# Patient Record
Sex: MALE | Race: ASIAN | NOT HISPANIC OR LATINO | URBAN - METROPOLITAN AREA
[De-identification: names, ages, dates, MRNs, and addresses within clinical notes are randomized per-mention and may not be internally consistent; named-entity substitution may affect disease eponyms.]

---

## 2018-07-17 ENCOUNTER — EMERGENCY (EMERGENCY)
Facility: HOSPITAL | Age: 28
LOS: 1 days | Discharge: HOME | End: 2018-07-17
Admitting: EMERGENCY MEDICINE
Payer: COMMERCIAL

## 2018-07-17 VITALS
DIASTOLIC BLOOD PRESSURE: 79 MMHG | SYSTOLIC BLOOD PRESSURE: 113 MMHG | TEMPERATURE: 98 F | OXYGEN SATURATION: 100 % | RESPIRATION RATE: 16 BRPM | HEART RATE: 67 BPM

## 2018-07-17 PROCEDURE — 99282 EMERGENCY DEPT VISIT SF MDM: CPT | Mod: 25

## 2018-07-17 PROCEDURE — 99053 MED SERV 10PM-8AM 24 HR FAC: CPT

## 2018-07-17 RX ORDER — IBUPROFEN 200 MG
600 TABLET ORAL ONCE
Qty: 0 | Refills: 0 | Status: COMPLETED | OUTPATIENT
Start: 2018-07-17 | End: 2018-07-17

## 2018-07-17 RX ADMIN — Medication 600 MILLIGRAM(S): at 02:01

## 2018-07-17 NOTE — ED PROVIDER NOTE - PLAN OF CARE
pls rest, drink plenty f fluids, tylenol/motrin every 6-8 hours as needed, f/u with your pmd, return for any worsening symptoms or any other concerning symptoms
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
## 2021-10-13 ENCOUNTER — EMERGENCY (EMERGENCY)
Facility: HOSPITAL | Age: 31
LOS: 1 days | Discharge: HOME | End: 2021-10-13
Admitting: HOSPITALIST
Payer: MEDICAID

## 2021-10-13 VITALS
OXYGEN SATURATION: 100 % | DIASTOLIC BLOOD PRESSURE: 88 MMHG | SYSTOLIC BLOOD PRESSURE: 126 MMHG | RESPIRATION RATE: 18 BRPM | HEART RATE: 84 BPM | TEMPERATURE: 98 F

## 2021-10-13 PROCEDURE — 93010 ELECTROCARDIOGRAM REPORT: CPT

## 2021-10-13 PROCEDURE — 99284 EMERGENCY DEPT VISIT MOD MDM: CPT | Mod: 25

## 2021-10-13 RX ORDER — ACETAMINOPHEN 500 MG
650 TABLET ORAL ONCE
Refills: 0 | Status: COMPLETED | OUTPATIENT
Start: 2021-10-13 | End: 2021-10-13

## 2021-10-13 RX ORDER — SODIUM CHLORIDE 9 MG/ML
1000 INJECTION INTRAMUSCULAR; INTRAVENOUS; SUBCUTANEOUS ONCE
Refills: 0 | Status: COMPLETED | OUTPATIENT
Start: 2021-10-13 | End: 2021-10-13

## 2021-10-14 VITALS
RESPIRATION RATE: 17 BRPM | OXYGEN SATURATION: 100 % | SYSTOLIC BLOOD PRESSURE: 142 MMHG | DIASTOLIC BLOOD PRESSURE: 76 MMHG | TEMPERATURE: 98 F | HEART RATE: 93 BPM

## 2021-10-14 LAB
ALBUMIN SERPL ELPH-MCNC: 4.6 G/DL — SIGNIFICANT CHANGE UP (ref 3.3–5)
ALP SERPL-CCNC: 100 U/L — SIGNIFICANT CHANGE UP (ref 40–120)
ALT FLD-CCNC: 25 U/L — SIGNIFICANT CHANGE UP (ref 4–41)
ANION GAP SERPL CALC-SCNC: 11 MMOL/L — SIGNIFICANT CHANGE UP (ref 7–14)
AST SERPL-CCNC: 25 U/L — SIGNIFICANT CHANGE UP (ref 4–40)
BASOPHILS # BLD AUTO: 0.04 K/UL — SIGNIFICANT CHANGE UP (ref 0–0.2)
BASOPHILS NFR BLD AUTO: 0.6 % — SIGNIFICANT CHANGE UP (ref 0–2)
BILIRUB SERPL-MCNC: <0.2 MG/DL — SIGNIFICANT CHANGE UP (ref 0.2–1.2)
BUN SERPL-MCNC: 9 MG/DL — SIGNIFICANT CHANGE UP (ref 7–23)
CALCIUM SERPL-MCNC: 9.8 MG/DL — SIGNIFICANT CHANGE UP (ref 8.4–10.5)
CHLORIDE SERPL-SCNC: 101 MMOL/L — SIGNIFICANT CHANGE UP (ref 98–107)
CO2 SERPL-SCNC: 26 MMOL/L — SIGNIFICANT CHANGE UP (ref 22–31)
CREAT SERPL-MCNC: 0.84 MG/DL — SIGNIFICANT CHANGE UP (ref 0.5–1.3)
EOSINOPHIL # BLD AUTO: 0.05 K/UL — SIGNIFICANT CHANGE UP (ref 0–0.5)
EOSINOPHIL NFR BLD AUTO: 0.7 % — SIGNIFICANT CHANGE UP (ref 0–6)
GLUCOSE SERPL-MCNC: 94 MG/DL — SIGNIFICANT CHANGE UP (ref 70–99)
HCT VFR BLD CALC: 45.8 % — SIGNIFICANT CHANGE UP (ref 39–50)
HGB BLD-MCNC: 15.4 G/DL — SIGNIFICANT CHANGE UP (ref 13–17)
IANC: 2.93 K/UL — SIGNIFICANT CHANGE UP (ref 1.5–8.5)
IMM GRANULOCYTES NFR BLD AUTO: 0.4 % — SIGNIFICANT CHANGE UP (ref 0–1.5)
LYMPHOCYTES # BLD AUTO: 3.35 K/UL — HIGH (ref 1–3.3)
LYMPHOCYTES # BLD AUTO: 48.1 % — HIGH (ref 13–44)
MAGNESIUM SERPL-MCNC: 2.3 MG/DL — SIGNIFICANT CHANGE UP (ref 1.6–2.6)
MCHC RBC-ENTMCNC: 31 PG — SIGNIFICANT CHANGE UP (ref 27–34)
MCHC RBC-ENTMCNC: 33.6 GM/DL — SIGNIFICANT CHANGE UP (ref 32–36)
MCV RBC AUTO: 92.3 FL — SIGNIFICANT CHANGE UP (ref 80–100)
MONOCYTES # BLD AUTO: 0.57 K/UL — SIGNIFICANT CHANGE UP (ref 0–0.9)
MONOCYTES NFR BLD AUTO: 8.2 % — SIGNIFICANT CHANGE UP (ref 2–14)
NEUTROPHILS # BLD AUTO: 2.93 K/UL — SIGNIFICANT CHANGE UP (ref 1.8–7.4)
NEUTROPHILS NFR BLD AUTO: 42 % — LOW (ref 43–77)
NRBC # BLD: 0 /100 WBCS — SIGNIFICANT CHANGE UP
NRBC # FLD: 0 K/UL — SIGNIFICANT CHANGE UP
PHOSPHATE SERPL-MCNC: 3.9 MG/DL — SIGNIFICANT CHANGE UP (ref 2.5–4.5)
PLATELET # BLD AUTO: 172 K/UL — SIGNIFICANT CHANGE UP (ref 150–400)
POTASSIUM SERPL-MCNC: 3.8 MMOL/L — SIGNIFICANT CHANGE UP (ref 3.5–5.3)
POTASSIUM SERPL-SCNC: 3.8 MMOL/L — SIGNIFICANT CHANGE UP (ref 3.5–5.3)
PROT SERPL-MCNC: 7.8 G/DL — SIGNIFICANT CHANGE UP (ref 6–8.3)
RBC # BLD: 4.96 M/UL — SIGNIFICANT CHANGE UP (ref 4.2–5.8)
RBC # FLD: 12.2 % — SIGNIFICANT CHANGE UP (ref 10.3–14.5)
SODIUM SERPL-SCNC: 138 MMOL/L — SIGNIFICANT CHANGE UP (ref 135–145)
TSH SERPL-MCNC: 3.37 UIU/ML — SIGNIFICANT CHANGE UP (ref 0.27–4.2)
WBC # BLD: 6.97 K/UL — SIGNIFICANT CHANGE UP (ref 3.8–10.5)
WBC # FLD AUTO: 6.97 K/UL — SIGNIFICANT CHANGE UP (ref 3.8–10.5)

## 2021-10-14 PROCEDURE — 71046 X-RAY EXAM CHEST 2 VIEWS: CPT | Mod: 26

## 2021-10-14 RX ADMIN — Medication 650 MILLIGRAM(S): at 00:31

## 2021-10-14 RX ADMIN — SODIUM CHLORIDE 1000 MILLILITER(S): 9 INJECTION INTRAMUSCULAR; INTRAVENOUS; SUBCUTANEOUS at 00:32

## 2021-10-14 NOTE — ED PROVIDER NOTE - CLINICAL SUMMARY MEDICAL DECISION MAKING FREE TEXT BOX
HPI- Patient is a 31 y.o male with no known PMHx who presents to ED c/o generalized weakness and fatigue over past few weeks. Pt states he was living in [place] x 2 months and symptoms began there and then he returned home on 9/27 and states still feeling tired. Also states over past 5 days he developed sinus congestion and pressure and a post nasal drip. Pt went to his PCP 2 days ago and was given nasal spray with some relief. Pt states he was tested for covid also 5 days ago and it was negative. Admits children are sick at home with URI. Pt mentioned to day he has some "chest congestion" but denies chest pain. Denies sob, n/v/d, melena, fevers, chills, headaches, dizziness, rashes, pleuritic pain, or any other complaints.

## 2021-10-14 NOTE — ED PROVIDER NOTE - PATIENT PORTAL LINK FT
You can access the FollowMyHealth Patient Portal offered by [facility] by registering at the following website: [email or web address]. By joining [facility]’s FollowMyHealth portal, you will also be able to view your health information using other applications (apps) compatible with our system.

## 2021-10-14 NOTE — ED PROVIDER NOTE - PROGRESS NOTE DETAILS
JOSE Whitaker- Pt labs and imaging reviewed and discussed with patient. No acute findings noted. All questions and concerns addressed. Strict return instructions given. Pt advised to f/u with PCP.

## 2021-10-14 NOTE — ED ADULT NURSE NOTE - OBJECTIVE STATEMENT
Pt A&ox4, ambulatory. Pt in NAD, resp equal and unlabored. Pt endorses weakness and lethargy for 1 week. Pt denies; decrease in appetite, syncope, SOB, chest pain, n/v/d, fever/chills. 20G to L ac labs drawn and sent as per Md order, pt medicated as per MD order.

## 2021-10-14 NOTE — ED PROVIDER NOTE - NSFOLLOWUPINSTRUCTIONS_ED_ALL_ED_FT
Patient advised to follow up with PRIMARY CARE DOCTOR IN 1-2 DAYS  and told to return to the emergency department immediately for any new or concerning symptoms  OR ANY OTHER COMPLAINTS. Patient agrees with plan.      Rest. stay hydrated  Continue to use nasal spray     Advance activity as tolerated.  Continue all previously prescribed medications as directed unless otherwise instructed.  Follow up with your primary care physician in 48-72 hours- bring copies of your results.  Return to the ER for worsening or persistent symptoms, and/or ANY NEW OR CONCERNING SYMPTOMS. If you have issues obtaining follow up, please call: [phone] to obtain a doctor or specialist who takes your insurance in your area.  You may call [phone] to make an appointment with the internal medicine clinic.

## 2021-10-14 NOTE — ED PROVIDER NOTE - PHYSICAL EXAMINATION
Vital signs reviewed.   CONSTITUTIONAL: Well-appearing; well-nourished; in no apparent distress. Non-toxic appearing.   HEAD: Normocephalic, atraumatic.  EYES: PERRL, EOM intact, conjunctiva and sclera WNL.  ENT: normal nose; + nasal congestion; normal pharynx with no tonsillar hypertrophy, no erythema, no exudate, no lymphadenopathy.  NECK/LYMPH: Supple; non-tender;   CARD: Normal S1, S2; no murmurs, rubs, or gallops noted.  RESP: Normal chest excursion with respiration; breath sounds clear and equal bilaterally; no wheezes, rhonchi, or rales.  ABD/GI: soft, non-distended; non-tender;  EXT/MS: moves all extremities  SKIN: Normal for age and race; warm; dry; good turgor; no apparent lesions or exudate noted.  NEURO: Awake, alert, oriented x 3, no gross deficits, no motor or sensory deficit noted.  PSYCH: Normal mood; appropriate affect.

## 2021-10-31 ENCOUNTER — EMERGENCY (EMERGENCY)
Facility: HOSPITAL | Age: 31
LOS: 1 days | Discharge: HOME | End: 2021-10-31
Attending: EMERGENCY MEDICINE | Admitting: EMERGENCY MEDICINE
Payer: MEDICAID

## 2021-10-31 VITALS
RESPIRATION RATE: 16 BRPM | OXYGEN SATURATION: 100 % | DIASTOLIC BLOOD PRESSURE: 75 MMHG | SYSTOLIC BLOOD PRESSURE: 107 MMHG | TEMPERATURE: 99 F | HEART RATE: 66 BPM

## 2021-10-31 VITALS
TEMPERATURE: 98 F | SYSTOLIC BLOOD PRESSURE: 129 MMHG | RESPIRATION RATE: 17 BRPM | OXYGEN SATURATION: 100 % | DIASTOLIC BLOOD PRESSURE: 82 MMHG | HEART RATE: 106 BPM

## 2021-10-31 LAB
ALBUMIN SERPL ELPH-MCNC: 5 G/DL — SIGNIFICANT CHANGE UP (ref 3.3–5)
ALP SERPL-CCNC: 79 U/L — SIGNIFICANT CHANGE UP (ref 40–120)
ALT FLD-CCNC: 23 U/L — SIGNIFICANT CHANGE UP (ref 4–41)
ANION GAP SERPL CALC-SCNC: 13 MMOL/L — SIGNIFICANT CHANGE UP (ref 7–14)
AST SERPL-CCNC: 21 U/L — SIGNIFICANT CHANGE UP (ref 4–40)
BASOPHILS # BLD AUTO: 0.03 K/UL — SIGNIFICANT CHANGE UP (ref 0–0.2)
BASOPHILS NFR BLD AUTO: 0.4 % — SIGNIFICANT CHANGE UP (ref 0–2)
BILIRUB SERPL-MCNC: 0.4 MG/DL — SIGNIFICANT CHANGE UP (ref 0.2–1.2)
BUN SERPL-MCNC: 10 MG/DL — SIGNIFICANT CHANGE UP (ref 7–23)
CALCIUM SERPL-MCNC: 9.9 MG/DL — SIGNIFICANT CHANGE UP (ref 8.4–10.5)
CHLORIDE SERPL-SCNC: 102 MMOL/L — SIGNIFICANT CHANGE UP (ref 98–107)
CO2 SERPL-SCNC: 25 MMOL/L — SIGNIFICANT CHANGE UP (ref 22–31)
CREAT SERPL-MCNC: 0.88 MG/DL — SIGNIFICANT CHANGE UP (ref 0.5–1.3)
EOSINOPHIL # BLD AUTO: 0.02 K/UL — SIGNIFICANT CHANGE UP (ref 0–0.5)
EOSINOPHIL NFR BLD AUTO: 0.2 % — SIGNIFICANT CHANGE UP (ref 0–6)
GLUCOSE SERPL-MCNC: 108 MG/DL — HIGH (ref 70–99)
HCT VFR BLD CALC: 45.8 % — SIGNIFICANT CHANGE UP (ref 39–50)
HGB BLD-MCNC: 16 G/DL — SIGNIFICANT CHANGE UP (ref 13–17)
IANC: 5.9 K/UL — SIGNIFICANT CHANGE UP (ref 1.5–8.5)
IMM GRANULOCYTES NFR BLD AUTO: 0.2 % — SIGNIFICANT CHANGE UP (ref 0–1.5)
LYMPHOCYTES # BLD AUTO: 1.81 K/UL — SIGNIFICANT CHANGE UP (ref 1–3.3)
LYMPHOCYTES # BLD AUTO: 21.5 % — SIGNIFICANT CHANGE UP (ref 13–44)
MAGNESIUM SERPL-MCNC: 2.3 MG/DL — SIGNIFICANT CHANGE UP (ref 1.6–2.6)
MCHC RBC-ENTMCNC: 31.6 PG — SIGNIFICANT CHANGE UP (ref 27–34)
MCHC RBC-ENTMCNC: 34.9 GM/DL — SIGNIFICANT CHANGE UP (ref 32–36)
MCV RBC AUTO: 90.5 FL — SIGNIFICANT CHANGE UP (ref 80–100)
MONOCYTES # BLD AUTO: 0.65 K/UL — SIGNIFICANT CHANGE UP (ref 0–0.9)
MONOCYTES NFR BLD AUTO: 7.7 % — SIGNIFICANT CHANGE UP (ref 2–14)
NEUTROPHILS # BLD AUTO: 5.9 K/UL — SIGNIFICANT CHANGE UP (ref 1.8–7.4)
NEUTROPHILS NFR BLD AUTO: 70 % — SIGNIFICANT CHANGE UP (ref 43–77)
NRBC # BLD: 0 /100 WBCS — SIGNIFICANT CHANGE UP
NRBC # FLD: 0 K/UL — SIGNIFICANT CHANGE UP
PHOSPHATE SERPL-MCNC: 3.2 MG/DL — SIGNIFICANT CHANGE UP (ref 2.5–4.5)
PLATELET # BLD AUTO: 155 K/UL — SIGNIFICANT CHANGE UP (ref 150–400)
POTASSIUM SERPL-MCNC: 4.1 MMOL/L — SIGNIFICANT CHANGE UP (ref 3.5–5.3)
POTASSIUM SERPL-SCNC: 4.1 MMOL/L — SIGNIFICANT CHANGE UP (ref 3.5–5.3)
PROT SERPL-MCNC: 7.8 G/DL — SIGNIFICANT CHANGE UP (ref 6–8.3)
RBC # BLD: 5.06 M/UL — SIGNIFICANT CHANGE UP (ref 4.2–5.8)
RBC # FLD: 12.2 % — SIGNIFICANT CHANGE UP (ref 10.3–14.5)
SODIUM SERPL-SCNC: 140 MMOL/L — SIGNIFICANT CHANGE UP (ref 135–145)
WBC # BLD: 8.43 K/UL — SIGNIFICANT CHANGE UP (ref 3.8–10.5)
WBC # FLD AUTO: 8.43 K/UL — SIGNIFICANT CHANGE UP (ref 3.8–10.5)

## 2021-10-31 PROCEDURE — 99284 EMERGENCY DEPT VISIT MOD MDM: CPT | Mod: 25

## 2021-10-31 PROCEDURE — 93010 ELECTROCARDIOGRAM REPORT: CPT

## 2021-10-31 NOTE — ED PROVIDER NOTE - PATIENT PORTAL LINK FT
You can access the FollowMyHealth Patient Portal offered by [facility] by registering at the following website: [email or web address]. By joining [facility]’s FollowMyHealth portal, you will also be able to view your health information using other applications (apps) compatible with our system.

## 2021-10-31 NOTE — ED ADULT NURSE NOTE - OBJECTIVE STATEMENT
Pt received in INT10C. C/o palpitations, dizziness, and diaphoresis lasting 1-2 hours after eating meals. Denies PMH. A&Ox4, ambulatory. Breathing even and unlabored. Denies chest pain, abd pain, nausea, vomiting, headache, dizziness or SOB at this time. State saw PMD and was advised to eat small frequent meals. 20G IV placed on left AC. Labs drawn and sent. Will reassess.

## 2021-10-31 NOTE — ED PROVIDER NOTE - OBJECTIVE STATEMENT
31yM w/no stated pmhx presenting with palpitations, shakiness, lightheadedness after eating x 1 week. Pt states for the past week after eating but worse in the morning time he feels shaky, hands have tremor, his heart "pounds" with palpitations and he is irritable. Pt states he saw his PMD and was instructed to eat smaller meals and exercise. Pt states he feels these symptoms when his blood sugar is high or low, although does not have glucometer at home, Pt denies fever/chills, abd pain, nausea, vomiting, diarrhea, chest pain, shortness of breath, cough, rhinorrhea, alcohol use, drug use or any other concerns.

## 2021-10-31 NOTE — ED PROVIDER NOTE - MDM ORDERS SUBMITTED SELECTION
Body Location Override (Optional - Billing Will Still Be Based On Selected Body Map Location If Applicable): left medial shin Wound Evaluated By: Dr. Tiarra Huitron Detail Level: Detailed Wound Dressing Override (Optional): vaseline and bandaid Labs/EKG/Imaging Studies

## 2021-10-31 NOTE — ED PROVIDER NOTE - CLINICAL SUMMARY MEDICAL DECISION MAKING FREE TEXT BOX
31yM w/no stated pmhx presenting with palpitations, shakiness, lightheadedness after eating x 1 week. On exam pt is well appearing, EKG NSR, abd soft. Pt was recently seen in ED on 10/14, had normal cbc/cmp/tsh, CXR. Plan: will repeat labs to r/o acute electrolyte abnormality, cbc/cmp/mag/phos, EKG. Likely outpt PMD/GI follow up Balijnder att: 32 yo M w/no stated pmhx presenting with palpitations, shakiness, lightheadedness after eating x 1 week. On exam pt is well appearing, EKG NSR, abd soft. Pt was recently seen in ED on 10/14, had normal cbc/cmp/tsh, CXR. Plan: will repeat labs to r/o acute electrolyte abnormality, cbc/cmp/mag/phos, EKG. Likely outpt PMD/GI follow up Baljinder att: 32 yo M w/no stated pmhx presenting with palpitations, shakiness, lightheadedness after eating x 1 week. Not having these symptoms while in the ED. On exam pt is well appearing, EKG NSR, abd soft. Pt was recently seen in ED on 10/14, had normal cbc/cmp/tsh, CXR. Plan: will repeat labs to r/o acute electrolyte abnormality, cbc/cmp/mag/phos, EKG. Likely outpt PMD/GI follow up.

## 2021-10-31 NOTE — ED ADULT TRIAGE NOTE - CHIEF COMPLAINT QUOTE
Pt c/o irritation, palpitations, shakiness and dizziness x1 week after eating. Pt was seen by PCP and told to "eat smaller meals" Denies PMH

## 2021-10-31 NOTE — ED PROVIDER NOTE - NSFOLLOWUPINSTRUCTIONS_ED_ALL_ED_FT
Follow up with your primary care doctor within 1 week  Follow up with a GI doctor within 1 week, referral list provided  Return to the ER with any worsening or concerning symptoms, weakness, fever, abdominal pain, feeling faint, chest pain or any other concerns.

## 2021-10-31 NOTE — ED PROVIDER NOTE - PROGRESS NOTE DETAILS
JOSE Rios: Labs within normal, vitals stable. Will d/c home with PMD/GI follow up, endo referral list given as pt feels symptoms are related to his blood sugar. Pt will return with any worsening or concerning symptoms.

## 2021-11-05 ENCOUNTER — APPOINTMENT (OUTPATIENT)
Dept: GASTROENTEROLOGY | Facility: CLINIC | Age: 31
End: 2021-11-05
Payer: MEDICAID

## 2021-11-05 ENCOUNTER — TRANSCRIPTION ENCOUNTER (OUTPATIENT)
Age: 31
End: 2021-11-05

## 2021-11-05 VITALS
BODY MASS INDEX: 21.33 KG/M2 | WEIGHT: 128 LBS | OXYGEN SATURATION: 98 % | DIASTOLIC BLOOD PRESSURE: 75 MMHG | TEMPERATURE: 95.9 F | HEART RATE: 84 BPM | HEIGHT: 65 IN | SYSTOLIC BLOOD PRESSURE: 114 MMHG

## 2021-11-05 DIAGNOSIS — R11.0 NAUSEA: ICD-10-CM

## 2021-11-05 DIAGNOSIS — K31.89 OTHER DISEASES OF STOMACH AND DUODENUM: ICD-10-CM

## 2021-11-05 DIAGNOSIS — Z78.9 OTHER SPECIFIED HEALTH STATUS: ICD-10-CM

## 2021-11-05 DIAGNOSIS — F17.200 NICOTINE DEPENDENCE, UNSPECIFIED, UNCOMPLICATED: ICD-10-CM

## 2021-11-05 DIAGNOSIS — K21.9 GASTRO-ESOPHAGEAL REFLUX DISEASE W/OUT ESOPHAGITIS: ICD-10-CM

## 2021-11-05 PROBLEM — Z00.00 ENCOUNTER FOR PREVENTIVE HEALTH EXAMINATION: Status: ACTIVE | Noted: 2021-11-05

## 2021-11-05 PROCEDURE — 99204 OFFICE O/P NEW MOD 45 MIN: CPT

## 2021-11-05 RX ORDER — PANTOPRAZOLE 40 MG/1
40 TABLET, DELAYED RELEASE ORAL DAILY
Qty: 30 | Refills: 3 | Status: ACTIVE | COMMUNITY
Start: 2021-11-05 | End: 1900-01-01

## 2021-11-05 RX ORDER — SIMETHICONE 180 MG
180 CAPSULE ORAL 4 TIMES DAILY
Qty: 120 | Refills: 3 | Status: ACTIVE | COMMUNITY
Start: 2021-11-05 | End: 1900-01-01

## 2021-11-05 NOTE — ASSESSMENT
[FreeTextEntry1] : Dyspepsia: The patient complains of dyspeptic symptoms.  The patient was advised to abide by an anti-gas diet.  The patient was given a pamphlet for anti-gas.  The patient and I reviewed the anti-gas diet at length. The patient is to start on a trial of Phazyme one tablet 3 times a day p.r.n. abdominal pain and gas.\par GERD: The patient was advised to avoid late-night meals and dietary indiscretions.  The patient was advised to avoid fried and fatty foods.  The patient was advised to abide by an anti-GERD diet. The patient was given a pamphlet for anti-GERD.  The patient and I reviewed the anti-GERD diet at length. I recommend a trial of Pantoprazole 40 mg once a day x 3 months for the symptoms.\par Nausea: The patient complains of nausea. If the symptoms of nausea persists, the patient may require a trial of Zofran 4 mg twice a day. \par If the symptoms persist, the patient may require an upper endoscopy to assess for peptic ulcer disease versus esophagitis.  The patient was told of the risks and benefits of the procedure.  The patient was told of the risks of perforation, emergency surgery, bleeding, infections and missed lesions.  The patient agreed and will follow-up to reassess the symptoms.\par I recommend an H. pylori breath test to assess the symptoms.\par Prior ER Evaluation:The patient was previously evaluated at the [facility] emergency room for abdominal pain, diarrhea, nausea and vomiting.   The patient had blood work and imaging studies to assess the symptoms.  I reviewed the blood work and imaging studies performed in the emergency room.\par Follow-up: The patient is to follow-up in the office in 4 weeks to reassess the symptoms. The patient was told to call the office if any further problems.

## 2021-11-05 NOTE — HISTORY OF PRESENT ILLNESS
[None] : had no significant interval events [Vomiting] : denies vomiting [Diarrhea] : denies diarrhea [Constipation] : denies constipation [Yellow Skin Or Eyes (Jaundice)] : denies jaundice [Rectal Pain] : denies rectal pain [Heartburn] : heartburn [Nausea] : nausea [Abdominal Pain] : abdominal pain [Abdominal Swelling] : abdominal swelling [GERD] : gastroesophageal reflux disease [Wt Gain ___ Lbs] : no recent weight gain [Wt Loss ___ Lbs] : no recent weight loss [Hiatus Hernia] : no hiatus hernia [Peptic Ulcer Disease] : no peptic ulcer disease [Pancreatitis] : no pancreatitis [Cholelithiasis] : no cholelithiasis [Kidney Stone] : no kidney stone [Inflammatory Bowel Disease] : no inflammatory bowel disease [Irritable Bowel Syndrome] : no irritable bowel syndrome [Diverticulitis] : no diverticulitis [Alcohol Abuse] : no alcohol abuse [Malignancy] : no malignancy [Abdominal Surgery] : no abdominal surgery [Appendectomy] : no appendectomy [Cholecystectomy] : no cholecystectomy [de-identified] : (+) smoking 1/2 PPD x1 2 years, (-) ETOH, (-) IVDA\par  [de-identified] : The patient is a 31-year-old male with no significant past medical history who was referred to my office by Dr. VITALIY Sutherland for dyspepsia, gastroesophageal reflux disease and nausea. The patient also admits to having an elevated blood glucose on recent blood work.  I was asked to render an opinion for consultation for the above complaints.   The patient states that he is feeling uncomfortable x 10 days.  The patient was recently evaluated at the [facility] emergency room on October 31, 2021 for palpitations.  The patient was previously evaluated in the emergency room for nonspecific complaints.  The patient had a history of erratic blood glucose.  The blood work performed in the emergency room on October 31, 2021 revealed no evidence of anemia with a hemoglobin/hematocrit level of 16.0/45.8, respectively, a mildly elevated blood glucose of 108 mg/dL, normal liver enzymes with a total bilirubin of 0.4 mg/dL, a normal alkaline phosphatase/AST/ALT of 79/21/23 U/L, respectively.  The EKG performed on October 31, 2021 revealed normal sinus rhythm at a rate of 88 bpm and a normal EKG.  A prior chest x-ray performed on October 14, 2021 revealed clear lungs.  The patient was discharged from the emergency room in stable condition and advised to follow-up in the office for further work-up and treatment. The patient denies any abdominal pain.  The patient complains of abdominal gas and bloating.  The patient complains of nausea but denies any vomiting.  The patient complains of gastroesophageal reflux disease but denies any dysphagia. The gastroesophageal reflux disease is worse after meals and late at night and in the early morning. The gastroesophageal reflux disease is improved with proton pump inhibitors, H2 blockers and antacids.  The patient denies any atypical chest pain, shortness of breath or palpitations.  The patient denies any diaphoresis. The patient denies any constipation or diarrhea.  The patient has 1 to 2 bowel movements a day.  The patient denies a change in bowel habits.  The patient denies a change in caliber of stool.  The patient denies having mucus discharge with the bowel movements.  The patient denies any bright red blood per rectum, melena or hematemesis.  The patient denies any rectal pain or rectal pruritus. The patient complains of weight loss but denies any anorexia.  The patient admits to losing 4 to 5 pounds over the past 1 week.  He denies any fevers or chills.  The patient denies any jaundice or pruritus.  The patient complains of occasional lower back pain.  The patient denies ever having a prior upper endoscopy and colonoscopy performed by another gastroenterologist. The patient admits to a family history of GI problems.  The patient’s mother had a history of ?gallbladder disease and chronic hepatitis C.

## 2021-11-05 NOTE — REVIEW OF SYSTEMS
[Feeling Tired] : feeling tired [Eyesight Problems] : eyesight problems [Heartburn] : heartburn [Nocturia] : nocturia [Negative] : Heme/Lymph

## 2021-11-08 LAB
HEMOCCULT STL QL: NEGATIVE
UREA BREATH TEST QL: POSITIVE

## 2021-11-08 RX ORDER — AMOXICILLIN 500 MG/1
500 TABLET, FILM COATED ORAL TWICE DAILY
Qty: 56 | Refills: 0 | Status: ACTIVE | COMMUNITY
Start: 2021-11-08 | End: 1900-01-01

## 2021-11-08 RX ORDER — OMEPRAZOLE 40 MG/1
40 CAPSULE, DELAYED RELEASE ORAL TWICE DAILY
Qty: 28 | Refills: 0 | Status: ACTIVE | COMMUNITY
Start: 2021-11-08 | End: 1900-01-01

## 2021-11-08 RX ORDER — VARENICLINE TARTRATE 1 MG/1
1 TABLET, FILM COATED ORAL
Qty: 56 | Refills: 0 | Status: ACTIVE | COMMUNITY
Start: 2021-06-11

## 2021-11-08 RX ORDER — AZELASTINE HYDROCHLORIDE 137 UG/1
137 SPRAY, METERED NASAL
Qty: 30 | Refills: 0 | Status: ACTIVE | COMMUNITY
Start: 2021-10-11

## 2021-11-08 RX ORDER — CLARITHROMYCIN 500 MG/1
500 TABLET, FILM COATED ORAL TWICE DAILY
Qty: 28 | Refills: 0 | Status: ACTIVE | COMMUNITY
Start: 2021-11-08 | End: 1900-01-01

## 2021-11-08 RX ORDER — ALBUTEROL SULFATE 90 UG/1
108 (90 BASE) INHALANT RESPIRATORY (INHALATION)
Qty: 8 | Refills: 0 | Status: ACTIVE | COMMUNITY
Start: 2021-10-11

## 2021-11-08 RX ORDER — PREDNISONE 10 MG/1
10 TABLET ORAL
Qty: 20 | Refills: 0 | Status: ACTIVE | COMMUNITY
Start: 2021-06-11

## 2021-11-08 RX ORDER — CETIRIZINE HYDROCHLORIDE 10 MG/1
10 TABLET, COATED ORAL
Qty: 30 | Refills: 0 | Status: ACTIVE | COMMUNITY
Start: 2021-10-11

## 2021-11-08 RX ORDER — MONTELUKAST 10 MG/1
10 TABLET, FILM COATED ORAL
Qty: 30 | Refills: 0 | Status: ACTIVE | COMMUNITY
Start: 2021-06-11

## 2021-12-03 ENCOUNTER — NON-APPOINTMENT (OUTPATIENT)
Age: 31
End: 2021-12-03

## 2022-01-26 ENCOUNTER — APPOINTMENT (OUTPATIENT)
Dept: GASTROENTEROLOGY | Facility: CLINIC | Age: 32
End: 2022-01-26
Payer: MEDICAID

## 2022-01-26 VITALS
SYSTOLIC BLOOD PRESSURE: 92 MMHG | TEMPERATURE: 97.7 F | OXYGEN SATURATION: 100 % | HEIGHT: 65 IN | HEART RATE: 53 BPM | BODY MASS INDEX: 19.49 KG/M2 | WEIGHT: 117 LBS | DIASTOLIC BLOOD PRESSURE: 59 MMHG

## 2022-01-26 DIAGNOSIS — A04.8 OTHER SPECIFIED BACTERIAL INTESTINAL INFECTIONS: ICD-10-CM

## 2022-01-26 DIAGNOSIS — R10.84 GENERALIZED ABDOMINAL PAIN: ICD-10-CM

## 2022-01-26 PROCEDURE — 99214 OFFICE O/P EST MOD 30 MIN: CPT

## 2022-01-26 RX ORDER — FAMOTIDINE 40 MG/1
40 TABLET, FILM COATED ORAL
Qty: 60 | Refills: 3 | Status: ACTIVE | COMMUNITY
Start: 2022-01-26 | End: 1900-01-01

## 2022-01-26 RX ORDER — SIMETHICONE 180 MG
180 CAPSULE ORAL 4 TIMES DAILY
Qty: 120 | Refills: 3 | Status: ACTIVE | COMMUNITY
Start: 2022-01-26 | End: 1900-01-01

## 2022-01-26 NOTE — HISTORY OF PRESENT ILLNESS
[None] : had no significant interval events [Heartburn] : denies heartburn [Nausea] : denies nausea [Vomiting] : denies vomiting [Diarrhea] : denies diarrhea [Constipation] : denies constipation [Yellow Skin Or Eyes (Jaundice)] : denies jaundice [Rectal Pain] : denies rectal pain [Wt Loss ___ Lbs] : recent [unfilled] ~Upound(s) weight loss [Abdominal Pain] : abdominal pain [Abdominal Swelling] : abdominal swelling [Wt Gain ___ Lbs] : no recent weight gain [GERD] : no gastroesophageal reflux disease [Hiatus Hernia] : no hiatus hernia [Peptic Ulcer Disease] : no peptic ulcer disease [Pancreatitis] : no pancreatitis [Cholelithiasis] : no cholelithiasis [Kidney Stone] : no kidney stone [Inflammatory Bowel Disease] : no inflammatory bowel disease [Irritable Bowel Syndrome] : no irritable bowel syndrome [Diverticulitis] : no diverticulitis [Alcohol Abuse] : no alcohol abuse [Malignancy] : no malignancy [Abdominal Surgery] : no abdominal surgery [Appendectomy] : no appendectomy [Cholecystectomy] : no cholecystectomy [de-identified] : The patient states that he is feeling better. The patient denies any jaundice or pruritus.  The patient denies any chronic lower back pain. The patient complains of occasional abdominal pain.  The patient describes the abdominal pain as a crampy, intermittent periumbilical discomfort that is nonradiating in nature.  The abdominal pain is worse with meals and improves with passing gas or having a bowel movement.  The abdominal pain is described as mild in nature.  The abdominal pain occurs at night and in the morning.  The abdominal pain can occur at any time.   The abdominal pain has never awakened the patient from sleep.  The abdominal pain is relieved with certain medication such as simethicone.  The abdominal pain is associated with abdominal gas and bloating.  The patient denies any nausea or vomiting.  The patient denies any gastroesophageal reflux disease or dysphagia.  The patient denies any atypical chest pain, shortness of breath or palpitations.  The patient denies any diaphoresis. The patient denies any constipation or diarrhea.  The patient has 1 bowel movement a day. The patient denies a change in bowel habits.  The patient denies a change in caliber of stool.  The patient denies having mucus discharge with the bowel movements.  The patient denies any bright red blood per rectum, melena or hematemesis.  The patient denies any rectal pain or rectal pruritus.  The patient complains of weight loss but denies any anorexia.  The patient admits to losing 11 pounds over the past 2 months. The patient attributes the weight loss to recent change in diet and exercise.  He denies any fevers or chills.  The stool guaiac performed on November 5, 2021 was negative for occult blood. The H. pylori breath test performed on November 5, 2021 revealed a positive test. The patient completed a trial of amoxicillin 500 mg 2 tablets twice a day, clarithromycin 500 mg twice a day and omeprazole 40 mg twice a day for 2 weeks for H. pylori eradication. The patient was recently evaluated at the [facility] emergency room on October 31, 2021 for palpitations. The patient was previously evaluated in the emergency room for nonspecific complaints. The patient had a history of erratic blood glucose. The blood work performed in the emergency room on October 31, 2021 revealed no evidence of anemia with a hemoglobin/hematocrit level of 16.0/45.8, respectively, a mildly elevated blood glucose of 108 mg/dL, normal liver enzymes with a total bilirubin of 0.4 mg/dL, a normal alkaline phosphatase/AST/ALT of 79/21/23 U/L, respectively. The EKG performed on October 31, 2021 revealed normal sinus rhythm at a rate of 88 bpm and a normal EKG. A prior chest x-ray performed on October 14, 2021 revealed clear lungs. The patient was discharged from the emergency room in stable condition and advised to follow-up in the office for further work-up and treatment. The patient admits to a family history of GI problems. The patient’s mother had a history of ?gallbladder disease and chronic hepatitis C.  [de-identified] :  (+) smoking 1/2 PPD x 12 years, (-) ETOH, (-) IVDA\par \par

## 2022-01-26 NOTE — ASSESSMENT
[FreeTextEntry1] : Abdominal Pain: The patient complains of abdominal pain. The patient is to avoid nonsteroidal anti-inflammatory drugs and aspirin.  I recommend a trial of Pepcid 40 mg twice a day for 3 months for the symptoms.\par Dyspepsia: The patient complains of dyspeptic symptoms.  The patient was advised to continue to abide by an anti-gas (low FOD-MAP) diet.  The patient was previously given a pamphlet for anti-gas (low FOD-MAP).  The patient and I reviewed the anti-gas (low FOD-MAP)diet at length again. The patient is to continue on a trial of Simethicone one tablet 4 times a day p.r.n. abdominal pain and gas.\par Weight Loss: The patient complains of weight loss but denies any anorexia.  The patient admits to losing 11 pounds over the past 2 months. The patient attributes the weight loss to recent change in diet and exercise.  \par If the symptoms persist, the patient may require an upper endoscopy to assess for peptic ulcer disease versus esophagitis. The patient was told of the risks and benefits of the procedure. The patient was told of the risks of perforation, emergency surgery, bleeding, infections and missed lesions. The patient agreed and will follow-up to reassess the symptoms.\par (+) H. Pylori Breath Test: The H. pylori breath test performed on November 5, 2021 revealed a positive test. The patient completed a trial of amoxicillin 500 mg 2 tablets twice a day, clarithromycin 500 mg twice a day and omeprazole 40 mg twice a day for 2 weeks for H. pylori eradication.  I recommend an H. pylori breath test to assess the symptoms.\par Prior ER Evaluation: The patient was previously evaluated at the [facility] emergency room for dyspepsia and malaise. The patient had blood work and imaging studies to assess the symptoms. I reviewed the blood work and imaging studies performed in the emergency room.\par Follow-up: The patient is to follow-up in the office in 6 months to reassess the symptoms. The patient was told to call the office if any further problems. \par

## 2022-01-27 ENCOUNTER — NON-APPOINTMENT (OUTPATIENT)
Age: 32
End: 2022-01-27

## 2022-01-27 LAB — UREA BREATH TEST QL: NEGATIVE

## 2022-08-31 ENCOUNTER — APPOINTMENT (OUTPATIENT)
Dept: GASTROENTEROLOGY | Facility: CLINIC | Age: 32
End: 2022-08-31

## 2022-08-31 VITALS
DIASTOLIC BLOOD PRESSURE: 68 MMHG | HEIGHT: 65 IN | WEIGHT: 117 LBS | TEMPERATURE: 97.3 F | HEART RATE: 75 BPM | BODY MASS INDEX: 19.49 KG/M2 | OXYGEN SATURATION: 99 % | SYSTOLIC BLOOD PRESSURE: 110 MMHG

## 2022-08-31 DIAGNOSIS — R63.4 ABNORMAL WEIGHT LOSS: ICD-10-CM

## 2022-08-31 DIAGNOSIS — R10.13 EPIGASTRIC PAIN: ICD-10-CM

## 2022-08-31 PROCEDURE — 99213 OFFICE O/P EST LOW 20 MIN: CPT

## 2022-08-31 RX ORDER — FAMOTIDINE 40 MG/1
40 TABLET, FILM COATED ORAL
Qty: 60 | Refills: 1 | Status: ACTIVE | COMMUNITY
Start: 2022-08-31 | End: 1900-01-01

## 2022-08-31 RX ORDER — SIMETHICONE 180 MG
180 CAPSULE ORAL 4 TIMES DAILY
Qty: 120 | Refills: 3 | Status: ACTIVE | COMMUNITY
Start: 2022-08-31 | End: 1900-01-01

## 2022-08-31 NOTE — HISTORY OF PRESENT ILLNESS
[None] : had no significant interval events [Heartburn] : denies heartburn [Nausea] : denies nausea [Vomiting] : denies vomiting [Diarrhea] : denies diarrhea [Constipation] : denies constipation [Yellow Skin Or Eyes (Jaundice)] : denies jaundice [Rectal Pain] : denies rectal pain [Abdominal Swelling] : abdominal swelling [Wt Gain ___ Lbs] : no recent weight gain [Wt Loss ___ Lbs] : no recent weight loss [GERD] : no gastroesophageal reflux disease [Hiatus Hernia] : no hiatus hernia [Peptic Ulcer Disease] : no peptic ulcer disease [Pancreatitis] : no pancreatitis [Cholelithiasis] : no cholelithiasis [Kidney Stone] : no kidney stone [Inflammatory Bowel Disease] : no inflammatory bowel disease [Irritable Bowel Syndrome] : no irritable bowel syndrome [Diverticulitis] : no diverticulitis [Alcohol Abuse] : no alcohol abuse [Malignancy] : no malignancy [Abdominal Surgery] : no abdominal surgery [Appendectomy] : no appendectomy [Cholecystectomy] : no cholecystectomy [de-identified] : The patient states that he is feeling better. The patient denies any jaundice or pruritus.  The patient complains of occasional lower back pain. The patient denies any abdominal pain.  The patient complains of abdominal gas and bloating.  The patient denies any nausea or vomiting.  The patient denies any gastroesophageal reflux disease or dysphagia.   The patient complains of shortness of breath and palpitations but denies any atypical chest pain.  The patient denies any diaphoresis. The patient denies any constipation or diarrhea.  The patient has 1 bowel movement a day. The patient denies a change in bowel habits.  The patient denies a change in caliber of stool.  The patient denies having mucus discharge with the bowel movements.  The patient denies any bright red blood per rectum, melena or hematemesis.  The patient denies any rectal pain or rectal pruritus.  The patient denies any weight loss or anorexia.  He denies any fevers or chills.  The stool guaiac performed on November 5, 2021 was negative for occult blood. The H. pylori breath test performed on November 5, 2021 revealed a positive test. The patient completed a trial of amoxicillin 500 mg 2 tablets twice a day, clarithromycin 500 mg twice a day and omeprazole 40 mg twice a day for 2 weeks for H. pylori eradication. The H. pylori breath test performed on January 26, 2022 was not detected. The patient was recently evaluated at the [facility] emergency room on October 31, 2021 for palpitations. The patient was previously evaluated in the emergency room for nonspecific complaints. The patient had a history of erratic blood glucose. The blood work performed in the emergency room on October 31, 2021 revealed no evidence of anemia with a hemoglobin/hematocrit level of 16.0/45.8, respectively, a mildly elevated blood glucose of 108 mg/dL, normal liver enzymes with a total bilirubin of 0.4 mg/dL, a normal alkaline phosphatase/AST/ALT of 79/21/23 U/L, respectively. The EKG performed on October 31, 2021 revealed normal sinus rhythm at a rate of 88 bpm and a normal EKG. A prior chest x-ray performed on October 14, 2021 revealed clear lungs. The patient was discharged from the emergency room in stable condition and advised to follow-up in the office for further work-up and treatment. The patient admits to a family history of GI problems. The patient’s mother had a history of ?gallbladder disease and chronic hepatitis C.  [de-identified] : (+) smoking 1/2 PPD x 12 years, (-) ETOH, (-) IVDA\par

## 2022-08-31 NOTE — ASSESSMENT
[FreeTextEntry1] : Dyspepsia: The patient complains of dyspeptic symptoms.  The patient was advised to continue to abide by an anti-gas (low FOD-MAP) diet.  The patient was previously given a pamphlet for anti-gas (low FOD-MAP).  The patient and I reviewed the anti-gas (low FOD-MAP)diet at length again. The patient is to continue on a trial of Simethicone one tablet 4 times a day p.r.n. abdominal pain and gas.  I recommend a trial of Famotidine 40 mg twice a day for 3 months for the symptoms.  \par History of Weight Loss: The patient previously complained of weight loss but denied any anorexia. The patient admitted to losing 11 pounds over 2 months. The patient attributed the weight loss to change in diet and exercise. \par If the symptoms recur, the patient may require an upper endoscopy to assess for peptic ulcer disease versus esophagitis. The patient was told of the risks and benefits of the procedure. The patient was told of the risks of perforation, emergency surgery, bleeding, infections and missed lesions. The patient agreed and will follow-up to reassess the symptoms.\par (+) H. Pylori Breath Test: The H. pylori breath test performed on November 5, 2021 revealed a positive test. The patient completed a trial of amoxicillin 500 mg 2 tablets twice a day, clarithromycin 500 mg twice a day and omeprazole 40 mg twice a day for 2 weeks for H. pylori eradication. The H. pylori breath test performed on January 26, 2022 was not detected.\par Prior ER Evaluation: The patient [place] [facility] [place]psia and malaise. The patient had blood work and imaging studies to assess the symptoms. I reviewed the blood work and imaging studies performed in the emergency room.\par Follow-up: The patient is to follow-up in the office in 1 year to reassess the symptoms. The patient was told to call the office if any further problems. \par

## 2022-11-17 ENCOUNTER — NON-APPOINTMENT (OUTPATIENT)
Age: 32
End: 2022-11-17

## 2022-11-18 ENCOUNTER — NON-APPOINTMENT (OUTPATIENT)
Age: 32
End: 2022-11-18

## 2022-11-18 DIAGNOSIS — Z01.818 ENCOUNTER FOR OTHER PREPROCEDURAL EXAMINATION: ICD-10-CM

## 2022-11-18 RX ORDER — PANTOPRAZOLE 40 MG/1
40 TABLET, DELAYED RELEASE ORAL
Qty: 30 | Refills: 3 | Status: ACTIVE | COMMUNITY
Start: 2022-11-18 | End: 1900-01-01

## 2022-12-14 ENCOUNTER — APPOINTMENT (OUTPATIENT)
Dept: GASTROENTEROLOGY | Facility: CLINIC | Age: 32
End: 2022-12-14

## 2023-07-09 NOTE — ED ADULT NURSE NOTE - NSFALLRSKOUTCOME_ED_ALL_ED
Consult received for diet education. Dietitian not on site over the weekend. Nutrition services to provide printed diet materials. RN to handoff to patient. Dietitian will follow up with patient to complete diet education during the week, as schedule allows. If patient happens to be discharged over the weekend, will be contacted via phone.    Universal Safety Interventions

## 2024-01-18 NOTE — ED ADULT NURSE NOTE - NSFALLRSKUNASSIST_ED_ALL_ED
From: Kay Tracy  To: Sorin Sanchezzak  Sent: 1/18/2024 12:30 AM CST  Subject: Blood sugar dropping at night    Hi - I have been having more frequent episodes of my blood sugar dropping really low, sometimes right before bed but usually during the night. Sometimes it wakes me up and I have been feeling shaky, nauseous and my lips are tingly. It is scaring me. Sometimes I can get back to sleep, but it is hard. And usually when I wake up in the morning I just don’t feel well, and sometimes I throw up after being up in the middle of the night. Not sure what I should do? I usually feel ok during the day.   no